# Patient Record
Sex: MALE | Race: WHITE | NOT HISPANIC OR LATINO | Employment: UNEMPLOYED | URBAN - NONMETROPOLITAN AREA
[De-identification: names, ages, dates, MRNs, and addresses within clinical notes are randomized per-mention and may not be internally consistent; named-entity substitution may affect disease eponyms.]

---

## 2019-02-20 ENCOUNTER — OFFICE VISIT (OUTPATIENT)
Dept: CARDIOLOGY | Facility: CLINIC | Age: 34
End: 2019-02-20

## 2019-02-20 VITALS
WEIGHT: 207 LBS | BODY MASS INDEX: 27.43 KG/M2 | HEART RATE: 57 BPM | DIASTOLIC BLOOD PRESSURE: 100 MMHG | SYSTOLIC BLOOD PRESSURE: 132 MMHG | HEIGHT: 73 IN

## 2019-02-20 DIAGNOSIS — R00.1 BRADYCARDIA: ICD-10-CM

## 2019-02-20 DIAGNOSIS — I10 ESSENTIAL HYPERTENSION: ICD-10-CM

## 2019-02-20 DIAGNOSIS — R42 DIZZINESS: ICD-10-CM

## 2019-02-20 DIAGNOSIS — R01.1 MURMUR, CARDIAC: ICD-10-CM

## 2019-02-20 DIAGNOSIS — R07.2 PRECORDIAL PAIN: Primary | ICD-10-CM

## 2019-02-20 DIAGNOSIS — R94.31 ABNORMAL EKG: ICD-10-CM

## 2019-02-20 DIAGNOSIS — F19.90 IV DRUG USER: ICD-10-CM

## 2019-02-20 DIAGNOSIS — R00.2 PALPITATIONS: ICD-10-CM

## 2019-02-20 PROCEDURE — 99204 OFFICE O/P NEW MOD 45 MIN: CPT | Performed by: INTERNAL MEDICINE

## 2019-02-20 PROCEDURE — 93000 ELECTROCARDIOGRAM COMPLETE: CPT | Performed by: INTERNAL MEDICINE

## 2019-02-20 RX ORDER — TIZANIDINE 4 MG/1
4 TABLET ORAL 4 TIMES DAILY
COMMUNITY

## 2019-02-20 RX ORDER — GABAPENTIN 400 MG/1
CAPSULE ORAL
COMMUNITY
End: 2021-06-23

## 2019-02-20 RX ORDER — RANITIDINE 300 MG/1
300 TABLET ORAL NIGHTLY
Qty: 30 TABLET | Refills: 8 | Status: SHIPPED | OUTPATIENT
Start: 2019-02-20 | End: 2021-06-23

## 2019-02-20 RX ORDER — AMLODIPINE BESYLATE 5 MG/1
5 TABLET ORAL DAILY
Qty: 30 TABLET | Refills: 11 | Status: SHIPPED | OUTPATIENT
Start: 2019-02-20 | End: 2021-06-23

## 2019-02-20 RX ORDER — BUPRENORPHINE HYDROCHLORIDE AND NALOXONE HYDROCHLORIDE DIHYDRATE 8; 2 MG/1; MG/1
2 TABLET SUBLINGUAL DAILY
COMMUNITY
End: 2021-06-23

## 2019-02-20 NOTE — PROGRESS NOTES
Chief Complaint   Patient presents with   • Establish Care     abnormal EKG, accidental finding, ordered prior to starting suboxone clinic   • Palpitations     randomly occurs, races, pounds, will last 10-15 minutes, feels fatigued afterwards.    • Chest Pain     mid chest, sharp pain in mid chest, just lasted a second or two   • Aspirin     does not take a daily aspirin   • Hypertension     has been on BP med before,  lisinopril 40mg wasn't controlling, PCP added HCTZ, dropped it to low, pt just stopped both        CARDIAC COMPLAINTS  chest pressure/discomfort, dyspnea, fatigue and palpitations      Subjective   Everett Fox is a 33 y.o. male came in today for his initial cardiac evaluation.  He has history of hypertension for which he used to be on lisinopril in the past.  He has history of drug abuse in the form of IV drug use.  He now wants to quit completely and he was seen at your office about starting Suboxone.  He at that time explained that his been having sharp chest pain in the mid part of the chest which lasts for few seconds and subsides spontaneously.  It occurs mostly in the evening when he sits down.  He also has been noticing palpitation in the form of heart skipping and racing which lasts for 10-15 minutes and subsides.  He does feel tired and fatigued after that.  He also has been noticing shortness of breath which occurs mostly on exertion.  He has some occasional dizziness but never had any syncopal episode.  He used to be a smoker but quit in 2008.  He does dips tobacco.  He does use multiple different drugs.  The last time he used cocaine was few years ago.  He did have some lab work done at your office which showed that he has both hepatitis B and hepatitis C antibodies.  His renal function and liver function were within normal limit his TSH was normal.  He also had an EKG done which was abnormal and hence he is referred here for further evaluation.    History reviewed. No pertinent  surgical history.    Current Outpatient Medications   Medication Sig Dispense Refill   • buprenorphine-naloxone (SUBOXONE) 8-2 MG per SL tablet Place 2 tablets under the tongue Daily.     • gabapentin (NEURONTIN) 400 MG capsule 4 tabs once a day     • tiZANidine (ZANAFLEX) 4 MG tablet 1 tab twice a day     • amLODIPine (NORVASC) 5 MG tablet Take 1 tablet by mouth Daily. 30 tablet 11   • raNITIdine (ZANTAC) 300 MG tablet Take 1 tablet by mouth Every Night. 30 tablet 8     No current facility-administered medications for this visit.            ALLERGIES:  Allegra-d [fexofenadine-pseudoephed er] and Hydroxyzine    Past Medical History:   Diagnosis Date   • Abnormal ECG    • History of cholecystectomy    • Hypertension    • Kidney stones        Social History     Tobacco Use   Smoking Status Former Smoker   • Years: 10.00   • Types: Cigarettes   • Last attempt to quit:    • Years since quittin.1   Smokeless Tobacco Current User   • Types: Snuff          Family History   Problem Relation Age of Onset   • Irregular heart beat Mother    • Heart disease Father         CABG at 50 years old   • Hypertension Father    • Hyperlipidemia Father    • Irregular heart beat Sister        Review of Systems   Constitution: Positive for weakness and malaise/fatigue. Negative for decreased appetite.   HENT: Negative for congestion and sore throat.    Eyes: Negative for blurred vision.   Cardiovascular: Positive for chest pain, dyspnea on exertion and palpitations.   Respiratory: Positive for shortness of breath. Negative for snoring.    Endocrine: Negative for cold intolerance and heat intolerance.   Hematologic/Lymphatic: Negative for adenopathy. Does not bruise/bleed easily.   Skin: Negative for itching, nail changes and skin cancer.   Musculoskeletal: Negative for arthritis and myalgias.   Gastrointestinal: Positive for dysphagia. Negative for abdominal pain and heartburn.   Genitourinary: Negative for bladder incontinence  "and frequency.   Neurological: Positive for dizziness. Negative for light-headedness, seizures and vertigo.   Psychiatric/Behavioral: Negative for altered mental status.   Allergic/Immunologic: Negative for environmental allergies and hives.       Diabetes- No  Thyroid- normal    Objective     /100 (BP Location: Right arm)   Pulse 57   Ht 185.4 cm (73\")   Wt 93.9 kg (207 lb)   BMI 27.31 kg/m²     Physical Exam   Constitutional: He is oriented to person, place, and time. He appears well-developed and well-nourished.   HENT:   Head: Normocephalic.   Nose: Nose normal.   Eyes: EOM are normal. Pupils are equal, round, and reactive to light.   Neck: Normal range of motion. Neck supple.   Cardiovascular: Regular rhythm, S1 normal and S2 normal. Bradycardia present.   Murmur heard.  Pulmonary/Chest: Effort normal and breath sounds normal.   Abdominal: Soft. Bowel sounds are normal.   Musculoskeletal: Normal range of motion. He exhibits no edema.   Neurological: He is alert and oriented to person, place, and time.   Skin: Skin is warm and dry.   Psychiatric: He has a normal mood and affect.         ECG 12 Lead  Date/Time: 2/20/2019 4:46 PM  Performed by: Jessica Lamar MD  Authorized by: Jessica Lamar MD   Comparison: compared with previous ECG from 1/14/2019  Comparison to previous ECG: Rate is slower  Rhythm: sinus bradycardia  Rate: bradycardic  QRS axis: normal  Other findings: early repolarization    Clinical impression: non-specific ECG              Assessment/Plan   Patient's Body mass index is 27.31 kg/m². BMI is above normal parameters. Recommendations include: educational material, exercise counseling and nutrition counseling.     Everett was seen today for establish care, palpitations, chest pain, aspirin and hypertension.    Diagnoses and all orders for this visit:    Precordial pain  -     raNITIdine (ZANTAC) 300 MG tablet; Take 1 tablet by mouth Every Night.  -     Stress Test With " Myocardial Perfusion One Day; Future  -     High Sensitivity CRP; Future  -     Lipid Panel; Future    Palpitations  -     Adult Transthoracic Echo Complete W/ Cont if Necessary Per Protocol; Future    Abnormal EKG  -     Lipid Panel; Future    Dizziness  -     Adult Transthoracic Echo Complete W/ Cont if Necessary Per Protocol; Future    IV drug user    Murmur, cardiac    Bradycardia    Essential hypertension  -     amLODIPine (NORVASC) 5 MG tablet; Take 1 tablet by mouth Daily.     At baseline, his hypertensive and slightly bradycardic.  His EKG showed sinus bradycardia with early repolarization versus nonspecific ST-T changes.  His clinical examination reveals a BMI of 27.  He does have bradycardia and short systolic murmur at the mitral area.  Had a long talk with him about his lab.  He is advised to see a hepatologist regarding that he also has been having symptoms of dysphagia.  Is advised to have an EGD done.  Regarding the abnormal EKG, he needs further workup including stress test to evaluate his functional status, chronotropic response and to rule out ischemia.  He also need an echocardiogram to rule out any vegetation as well as evaluate his LV function and the valvular structures.  At this time I started him on amlodipine 5 mg once a day.  Also advised him to have labs done to check his CRP level and lipids.  Regarding the chest pain, I also started him on Zantac 300 mg once a day.  Based on the results of the tests, and the response to the medication further recommendations will be made.  Regarding his BMI, I also talked to him about different diets and gave him papers on DASH diet.               Electronically signed by Jessica Lamar MD February 20, 2019 4:39 PM

## 2019-02-20 NOTE — PATIENT INSTRUCTIONS
"DASH Eating Plan  DASH stands for \"Dietary Approaches to Stop Hypertension.\" The DASH eating plan is a healthy eating plan that has been shown to reduce high blood pressure (hypertension). It may also reduce your risk for type 2 diabetes, heart disease, and stroke. The DASH eating plan may also help with weight loss.  What are tips for following this plan?  General guidelines  · Avoid eating more than 2,300 mg (milligrams) of salt (sodium) a day. If you have hypertension, you may need to reduce your sodium intake to 1,500 mg a day.  · Limit alcohol intake to no more than 1 drink a day for nonpregnant women and 2 drinks a day for men. One drink equals 12 oz of beer, 5 oz of wine, or 1½ oz of hard liquor.  · Work with your health care provider to maintain a healthy body weight or to lose weight. Ask what an ideal weight is for you.  · Get at least 30 minutes of exercise that causes your heart to beat faster (aerobic exercise) most days of the week. Activities may include walking, swimming, or biking.  · Work with your health care provider or diet and nutrition specialist (dietitian) to adjust your eating plan to your individual calorie needs.  Reading food labels  · Check food labels for the amount of sodium per serving. Choose foods with less than 5 percent of the Daily Value of sodium. Generally, foods with less than 300 mg of sodium per serving fit into this eating plan.  · To find whole grains, look for the word \"whole\" as the first word in the ingredient list.  Shopping  · Buy products labeled as \"low-sodium\" or \"no salt added.\"  · Buy fresh foods. Avoid canned foods and premade or frozen meals.  Cooking  · Avoid adding salt when cooking. Use salt-free seasonings or herbs instead of table salt or sea salt. Check with your health care provider or pharmacist before using salt substitutes.  · Do not amin foods. Cook foods using healthy methods such as baking, boiling, grilling, and broiling instead.  · Cook with " heart-healthy oils, such as olive, canola, soybean, or sunflower oil.  Meal planning    · Eat a balanced diet that includes:  ? 5 or more servings of fruits and vegetables each day. At each meal, try to fill half of your plate with fruits and vegetables.  ? Up to 6-8 servings of whole grains each day.  ? Less than 6 oz of lean meat, poultry, or fish each day. A 3-oz serving of meat is about the same size as a deck of cards. One egg equals 1 oz.  ? 2 servings of low-fat dairy each day.  ? A serving of nuts, seeds, or beans 5 times each week.  ? Heart-healthy fats. Healthy fats called Omega-3 fatty acids are found in foods such as flaxseeds and coldwater fish, like sardines, salmon, and mackerel.  · Limit how much you eat of the following:  ? Canned or prepackaged foods.  ? Food that is high in trans fat, such as fried foods.  ? Food that is high in saturated fat, such as fatty meat.  ? Sweets, desserts, sugary drinks, and other foods with added sugar.  ? Full-fat dairy products.  · Do not salt foods before eating.  · Try to eat at least 2 vegetarian meals each week.  · Eat more home-cooked food and less restaurant, buffet, and fast food.  · When eating at a restaurant, ask that your food be prepared with less salt or no salt, if possible.  What foods are recommended?  The items listed may not be a complete list. Talk with your dietitian about what dietary choices are best for you.  Grains  Whole-grain or whole-wheat bread. Whole-grain or whole-wheat pasta. Brown rice. Oatmeal. Quinoa. Bulgur. Whole-grain and low-sodium cereals. Nina bread. Low-fat, low-sodium crackers. Whole-wheat flour tortillas.  Vegetables  Fresh or frozen vegetables (raw, steamed, roasted, or grilled). Low-sodium or reduced-sodium tomato and vegetable juice. Low-sodium or reduced-sodium tomato sauce and tomato paste. Low-sodium or reduced-sodium canned vegetables.  Fruits  All fresh, dried, or frozen fruit. Canned fruit in natural juice (without  added sugar).  Meat and other protein foods  Skinless chicken or turkey. Ground chicken or turkey. Pork with fat trimmed off. Fish and seafood. Egg whites. Dried beans, peas, or lentils. Unsalted nuts, nut butters, and seeds. Unsalted canned beans. Lean cuts of beef with fat trimmed off. Low-sodium, lean deli meat.  Dairy  Low-fat (1%) or fat-free (skim) milk. Fat-free, low-fat, or reduced-fat cheeses. Nonfat, low-sodium ricotta or cottage cheese. Low-fat or nonfat yogurt. Low-fat, low-sodium cheese.  Fats and oils  Soft margarine without trans fats. Vegetable oil. Low-fat, reduced-fat, or light mayonnaise and salad dressings (reduced-sodium). Canola, safflower, olive, soybean, and sunflower oils. Avocado.  Seasoning and other foods  Herbs. Spices. Seasoning mixes without salt. Unsalted popcorn and pretzels. Fat-free sweets.  What foods are not recommended?  The items listed may not be a complete list. Talk with your dietitian about what dietary choices are best for you.  Grains  Baked goods made with fat, such as croissants, muffins, or some breads. Dry pasta or rice meal packs.  Vegetables  Creamed or fried vegetables. Vegetables in a cheese sauce. Regular canned vegetables (not low-sodium or reduced-sodium). Regular canned tomato sauce and paste (not low-sodium or reduced-sodium). Regular tomato and vegetable juice (not low-sodium or reduced-sodium). Pickles. Olives.  Fruits  Canned fruit in a light or heavy syrup. Fried fruit. Fruit in cream or butter sauce.  Meat and other protein foods  Fatty cuts of meat. Ribs. Fried meat. Chowdhury. Sausage. Bologna and other processed lunch meats. Salami. Fatback. Hotdogs. Bratwurst. Salted nuts and seeds. Canned beans with added salt. Canned or smoked fish. Whole eggs or egg yolks. Chicken or turkey with skin.  Dairy  Whole or 2% milk, cream, and half-and-half. Whole or full-fat cream cheese. Whole-fat or sweetened yogurt. Full-fat cheese. Nondairy creamers. Whipped toppings.  Processed cheese and cheese spreads.  Fats and oils  Butter. Stick margarine. Lard. Shortening. Ghee. Chowdhury fat. Tropical oils, such as coconut, palm kernel, or palm oil.  Seasoning and other foods  Salted popcorn and pretzels. Onion salt, garlic salt, seasoned salt, table salt, and sea salt. Worcestershire sauce. Tartar sauce. Barbecue sauce. Teriyaki sauce. Soy sauce, including reduced-sodium. Steak sauce. Canned and packaged gravies. Fish sauce. Oyster sauce. Cocktail sauce. Horseradish that you find on the shelf. Ketchup. Mustard. Meat flavorings and tenderizers. Bouillon cubes. Hot sauce and Tabasco sauce. Premade or packaged marinades. Premade or packaged taco seasonings. Relishes. Regular salad dressings.  Where to find more information:  · National [employer], [employer] [place]: www.nhlbi.nih.gov  · American Heart Association: www.heart.org  Summary  · The DASH eating plan is a healthy eating plan that has been shown to reduce high blood pressure (hypertension). It may also reduce your risk for type 2 diabetes, heart disease, and stroke.  · With the DASH eating plan, you should limit salt (sodium) intake to 2,300 mg a day. If you have hypertension, you may need to reduce your sodium intake to 1,500 mg a day.  · When on the DASH eating plan, aim to eat more fresh fruits and vegetables, whole grains, lean proteins, low-fat dairy, and heart-healthy fats.  · Work with your health care provider or diet and nutrition specialist (dietitian) to adjust your eating plan to your individual calorie needs.  This information is not intended to replace advice given to you by your health care provider. Make sure you discuss any questions you have with your health care provider.  Document Released: 12/06/2012 Document Revised: 12/11/2017 Document Reviewed: 12/11/2017  [employer] Patient Education © 2018 [employer]

## 2019-03-04 ENCOUNTER — HOSPITAL ENCOUNTER (OUTPATIENT)
Dept: CARDIOLOGY | Facility: HOSPITAL | Age: 34
Discharge: HOME | End: 2019-03-04

## 2019-03-04 ENCOUNTER — LAB (OUTPATIENT)
Dept: LAB | Facility: HOSPITAL | Age: 34
End: 2019-03-04

## 2019-03-04 DIAGNOSIS — R94.31 ABNORMAL EKG: ICD-10-CM

## 2019-03-04 DIAGNOSIS — R07.2 PRECORDIAL PAIN: ICD-10-CM

## 2019-03-04 DIAGNOSIS — R42 DIZZINESS: ICD-10-CM

## 2019-03-04 DIAGNOSIS — R00.2 PALPITATIONS: ICD-10-CM

## 2019-03-04 LAB
BH CV ECHO MEAS - ACS: 1.9 CM
BH CV ECHO MEAS - AO MEAN PG: 5.5 MMHG
BH CV ECHO MEAS - AO ROOT AREA (BSA CORRECTED): 1.3
BH CV ECHO MEAS - AO ROOT AREA: 6.4 CM^2
BH CV ECHO MEAS - AO ROOT DIAM: 2.9 CM
BH CV ECHO MEAS - AO V2 MEAN: 109.2 CM/SEC
BH CV ECHO MEAS - AO V2 VTI: 36.1 CM
BH CV ECHO MEAS - BSA(HAYCOCK): 2.2 M^2
BH CV ECHO MEAS - BSA: 2.2 M^2
BH CV ECHO MEAS - BZI_BMI: 27.3 KILOGRAMS/M^2
BH CV ECHO MEAS - BZI_METRIC_HEIGHT: 185.4 CM
BH CV ECHO MEAS - BZI_METRIC_WEIGHT: 93.9 KG
BH CV ECHO MEAS - EDV(CUBED): 79.8 ML
BH CV ECHO MEAS - EDV(MOD-SP4): 93 ML
BH CV ECHO MEAS - EDV(TEICH): 83.3 ML
BH CV ECHO MEAS - EF(CUBED): 70.7 %
BH CV ECHO MEAS - EF(MOD-SP4): 69.9 %
BH CV ECHO MEAS - EF(TEICH): 62.7 %
BH CV ECHO MEAS - ESV(CUBED): 23.4 ML
BH CV ECHO MEAS - ESV(MOD-SP4): 28 ML
BH CV ECHO MEAS - ESV(TEICH): 31.1 ML
BH CV ECHO MEAS - FS: 33.6 %
BH CV ECHO MEAS - IVS/LVPW: 1
BH CV ECHO MEAS - IVSD: 1.3 CM
BH CV ECHO MEAS - LA DIMENSION: 4.1 CM
BH CV ECHO MEAS - LA/AO: 1.4
BH CV ECHO MEAS - LV DIASTOLIC VOL/BSA (35-75): 42.6 ML/M^2
BH CV ECHO MEAS - LV IVRT: 0.07 SEC
BH CV ECHO MEAS - LV MASS(C)D: 194.5 GRAMS
BH CV ECHO MEAS - LV MASS(C)DI: 89.1 GRAMS/M^2
BH CV ECHO MEAS - LV SYSTOLIC VOL/BSA (12-30): 12.8 ML/M^2
BH CV ECHO MEAS - LVIDD: 4.3 CM
BH CV ECHO MEAS - LVIDS: 2.9 CM
BH CV ECHO MEAS - LVLD AP4: 7.4 CM
BH CV ECHO MEAS - LVLS AP4: 5.6 CM
BH CV ECHO MEAS - LVOT AREA (M): 3.8 CM^2
BH CV ECHO MEAS - LVOT AREA: 3.8 CM^2
BH CV ECHO MEAS - LVOT DIAM: 2.2 CM
BH CV ECHO MEAS - LVPWD: 1.2 CM
BH CV ECHO MEAS - MV A MAX VEL: 71.1 CM/SEC
BH CV ECHO MEAS - MV DEC SLOPE: 373.8 CM/SEC^2
BH CV ECHO MEAS - MV E MAX VEL: 107.1 CM/SEC
BH CV ECHO MEAS - MV E/A: 1.5
BH CV ECHO MEAS - RAP SYSTOLE: 10 MMHG
BH CV ECHO MEAS - RVDD: 3.4 CM
BH CV ECHO MEAS - RVSP: 29.8 MMHG
BH CV ECHO MEAS - SI(AO): 106.1 ML/M^2
BH CV ECHO MEAS - SI(CUBED): 25.8 ML/M^2
BH CV ECHO MEAS - SI(MOD-SP4): 29.8 ML/M^2
BH CV ECHO MEAS - SI(TEICH): 23.9 ML/M^2
BH CV ECHO MEAS - SV(AO): 231.6 ML
BH CV ECHO MEAS - SV(CUBED): 56.4 ML
BH CV ECHO MEAS - SV(MOD-SP4): 65 ML
BH CV ECHO MEAS - SV(TEICH): 52.2 ML
BH CV ECHO MEAS - TR MAX VEL: 222.6 CM/SEC
BH CV STRESS RECOVERY BP: NORMAL MMHG
BH CV STRESS RECOVERY HR: 101 BPM
LV EF NUC BP: 67 %
MAXIMAL PREDICTED HEART RATE: 187 BPM
MAXIMAL PREDICTED HEART RATE: 187 BPM
PERCENT MAX PREDICTED HR: 88.24 %
STRESS BASELINE BP: NORMAL MMHG
STRESS BASELINE HR: 64 BPM
STRESS PERCENT HR: 104 %
STRESS POST ESTIMATED WORKLOAD: 10.1 METS
STRESS POST EXERCISE DUR MIN: 8 MIN
STRESS POST EXERCISE DUR SEC: 40 SEC
STRESS POST PEAK BP: NORMAL MMHG
STRESS POST PEAK HR: 165 BPM
STRESS TARGET HR: 159 BPM
STRESS TARGET HR: 159 BPM

## 2019-03-04 PROCEDURE — 0 TECHNETIUM SESTAMIBI: Performed by: INTERNAL MEDICINE

## 2019-03-04 PROCEDURE — 36415 COLL VENOUS BLD VENIPUNCTURE: CPT

## 2019-03-04 PROCEDURE — 93018 CV STRESS TEST I&R ONLY: CPT | Performed by: INTERNAL MEDICINE

## 2019-03-04 PROCEDURE — A9500 TC99M SESTAMIBI: HCPCS | Performed by: INTERNAL MEDICINE

## 2019-03-04 PROCEDURE — 78452 HT MUSCLE IMAGE SPECT MULT: CPT

## 2019-03-04 PROCEDURE — 80061 LIPID PANEL: CPT | Performed by: INTERNAL MEDICINE

## 2019-03-04 PROCEDURE — 93306 TTE W/DOPPLER COMPLETE: CPT | Performed by: INTERNAL MEDICINE

## 2019-03-04 PROCEDURE — 93017 CV STRESS TEST TRACING ONLY: CPT

## 2019-03-04 PROCEDURE — 86141 C-REACTIVE PROTEIN HS: CPT | Performed by: INTERNAL MEDICINE

## 2019-03-04 PROCEDURE — 93306 TTE W/DOPPLER COMPLETE: CPT

## 2019-03-04 PROCEDURE — 78452 HT MUSCLE IMAGE SPECT MULT: CPT | Performed by: INTERNAL MEDICINE

## 2019-03-04 RX ADMIN — TECHNETIUM TC 99M SESTAMIBI 1 DOSE: 1 INJECTION INTRAVENOUS at 11:28

## 2019-03-04 RX ADMIN — TECHNETIUM TC 99M SESTAMIBI 1 DOSE: 1 INJECTION INTRAVENOUS at 11:27

## 2019-03-05 LAB
CHOLEST SERPL-MCNC: 113 MG/DL (ref 0–200)
HDLC SERPL-MCNC: 34 MG/DL (ref 60–100)
LDLC SERPL CALC-MCNC: 25 MG/DL (ref 0–100)
LDLC/HDLC SERPL: 0.74 {RATIO}
TRIGL SERPL-MCNC: 270 MG/DL (ref 0–150)
VLDLC SERPL-MCNC: 54 MG/DL

## 2019-03-06 LAB — CRP SERPL HS-MCNC: 0.76 MG/L (ref 0–3)

## 2021-06-23 ENCOUNTER — OFFICE VISIT (OUTPATIENT)
Dept: CARDIOLOGY | Facility: CLINIC | Age: 36
End: 2021-06-23

## 2021-06-23 VITALS
SYSTOLIC BLOOD PRESSURE: 110 MMHG | BODY MASS INDEX: 31.6 KG/M2 | HEIGHT: 74 IN | WEIGHT: 246.2 LBS | HEART RATE: 64 BPM | DIASTOLIC BLOOD PRESSURE: 68 MMHG

## 2021-06-23 DIAGNOSIS — R55 SYNCOPE AND COLLAPSE: ICD-10-CM

## 2021-06-23 DIAGNOSIS — R42 DIZZINESS: ICD-10-CM

## 2021-06-23 DIAGNOSIS — R53.1 WEAKNESS: ICD-10-CM

## 2021-06-23 DIAGNOSIS — R00.2 PALPITATIONS: Primary | ICD-10-CM

## 2021-06-23 DIAGNOSIS — R73.9 HYPERGLYCEMIA: ICD-10-CM

## 2021-06-23 DIAGNOSIS — R60.0 BILATERAL LEG EDEMA: ICD-10-CM

## 2021-06-23 DIAGNOSIS — R06.02 SHORTNESS OF BREATH: ICD-10-CM

## 2021-06-23 DIAGNOSIS — E78.5 HYPERLIPIDEMIA LDL GOAL <100: ICD-10-CM

## 2021-06-23 PROCEDURE — 99214 OFFICE O/P EST MOD 30 MIN: CPT | Performed by: NURSE PRACTITIONER

## 2021-06-23 RX ORDER — LISINOPRIL 5 MG/1
5 TABLET ORAL DAILY
COMMUNITY
End: 2022-02-08

## 2021-06-23 RX ORDER — ARIPIPRAZOLE 10 MG/1
10 TABLET ORAL DAILY
COMMUNITY
End: 2022-02-08

## 2021-06-23 RX ORDER — HYDROXYZINE 50 MG/1
50 TABLET, FILM COATED ORAL 2 TIMES DAILY
COMMUNITY

## 2021-06-23 RX ORDER — TRAZODONE HYDROCHLORIDE 100 MG/1
100 TABLET ORAL NIGHTLY
COMMUNITY

## 2021-06-23 RX ORDER — PRAZOSIN HYDROCHLORIDE 2 MG/1
2 CAPSULE ORAL NIGHTLY
COMMUNITY

## 2021-06-23 RX ORDER — SERTRALINE HYDROCHLORIDE 100 MG/1
100 TABLET, FILM COATED ORAL 2 TIMES DAILY
COMMUNITY

## 2021-06-23 NOTE — PROGRESS NOTES
"Chief Complaint   Patient presents with   • Follow-up     Cardiac management   • Lab     Last labs a month ago per PCP.   • Fatigue     Feeling tired, no energy for the last 1-2 months. PCP obtained echo, see chart.   • Edema     Having swelling in feet and legs, PCP treated to cellulitis, also had US, US in chart.   • Shortness of Breath     Having with minimal exertion, states \"at night when I walk to bathroom I can't get my breath, will also have pain in mid chest that radiates to left and right chest\".   • Chest Pain     Will have occasional jerking sharp pain in mid chest, short in duration. Has occasional nausea associated with pain.   • Dizziness     Started 1-2 months ago, states \"I have blacked out and fell to floor a few times\".    • Blood pressure     Has been fluctuating.   • Medications     Has questions concerning use of Sofosbuvir-Velpatasvir?     Subjective       Everett Fox is a 35 y.o. male with hypertension and history of IV drug use on Suboxone, HCV, HBV who was referred in 2019 for evaluation of abnormal EKG, chest pain, palpitations, weakness and fatigue.  At consult, he was hypertensive, amlodipine 5 mg added.  EKG showed nonspecific ST changes, therefore stress test recommended.  Echocardiogram to rule out vegetation and evaluate LV function.  He walked almost 9 minutes on the treadmill, normal blood pressure and chronotropic response.  No ischemia seen on nuclear images.  Echocardiogram showed EF 65%, LA mildly dilated, mild MR, RVSP 30 mmHg, no vegetation.  No aortic valve pathology seen.  Cholesterol was 113, , HDL 34, LDL 25.  CRP was normal.  He was advised to follow strict low carbohydrate diet.  We did not see him after this.    He returns today for follow-up at the request of his primary care for evaluation of weakness, fatigue, dizziness, syncope, chest pain and lower extremity edema.  Since he was last seen, he has gained almost 40 pounds.  He is now following with the " methadone clinic as well as psychiatrist.  Amlodipine has been changed to lisinopril.  Blood pressure fluctuates from low to high.  He had an episode of syncope about a month ago which occurred after standing up and walking toward the door.  The next thing he knew he was on the floor.  His main concern is lower extremity edema, L>R.  PCP ruled out DVT.  Treated for LE cellulitis.  Echocardiogram on 6/4/2021 in [place] showed EF 65%, no diastolic dysfunction, mild to moderate MR, LA 4.2 cm, moderate AR, no AS.  He was given Lasix 20 mg but did not help much.  Apparently labs were done 1 to 2 months ago but copy not available.  His mother is concerned about left facial drooping and left-sided weakness.  No recent drug use.         Cardiac History:    Past Surgical History:   Procedure Laterality Date   • CARDIOVASCULAR STRESS TEST  03/04/2019    8 Min. 40 Secs.10.1 METS.  88% THR. BP- 169/56.EF 67%. Negative.   • ECHO - CONVERTED  03/04/2019    EF 65%. LA- 4.1 Cm. Mild MR. RVSP- 30 mmHg.     Current Outpatient Medications   Medication Sig Dispense Refill   • ARIPiprazole (ABILIFY) 10 MG tablet Take 10 mg by mouth Daily.     • hydrOXYzine (ATARAX) 50 MG tablet Take 50 mg by mouth 2 (two) times a day.     • lisinopril (PRINIVIL,ZESTRIL) 5 MG tablet Take 5 mg by mouth Daily.     • METHADONE HCL PO Take 85 mg by mouth Daily.     • prazosin (MINIPRESS) 2 MG capsule Take 2 mg by mouth Every Night.     • sertraline (ZOLOFT) 100 MG tablet Take 100 mg by mouth 2 (two) times a day.     • tiZANidine (ZANAFLEX) 4 MG tablet Take 4 mg by mouth 3 (Three) Times a Day.     • traZODone (DESYREL) 100 MG tablet Take 100 mg by mouth Every Night.       No current facility-administered medications for this visit.     Allegra-d [fexofenadine-pseudoephed er]    Past Medical History:   Diagnosis Date   • Abnormal ECG    • Hepatitis C    • History of cholecystectomy    • Hypertension    • Kidney stones      Social History     Socioeconomic  "History   • Marital status: Single     Spouse name: Not on file   • Number of children: Not on file   • Years of education: Not on file   • Highest education level: Not on file   Tobacco Use   • Smoking status: Former Smoker     Years: 10.00     Types: Cigarettes     Quit date:      Years since quittin.4   • Smokeless tobacco: Current User     Types: Snuff   Vaping Use   • Vaping Use: Never used   Substance and Sexual Activity   • Alcohol use: No   • Drug use: Not Currently     Types: Marijuana, Morphine     Comment: history of IV drug abuse, last used last week     Family History   Problem Relation Age of Onset   • Irregular heart beat Mother    • Stroke Mother    • Heart disease Father         CABG at 50 years old   • Hypertension Father    • Hyperlipidemia Father    • Irregular heart beat Sister      Review of Systems   Constitutional: Positive for malaise/fatigue and weight gain (Up 40 pounds in 2 years).   HENT: Negative.    Eyes: Negative.    Cardiovascular: Positive for chest pain, dyspnea on exertion, leg swelling, near-syncope, palpitations and syncope.   Respiratory: Positive for shortness of breath.    Endocrine: Positive for cold intolerance.   Hematologic/Lymphatic: Negative.    Skin: Positive for dry skin.   Gastrointestinal: Positive for nausea.   Genitourinary: Positive for dysuria, hesitancy and incomplete emptying.   Neurological: Positive for difficulty with concentration, dizziness, light-headedness, sensory change, tremors and weakness.   Psychiatric/Behavioral: Positive for altered mental status (Appears medicated), depression and substance abuse.      Objective     /68 (BP Location: Right arm)   Pulse 64   Ht 186.7 cm (73.5\")   Wt 112 kg (246 lb 3.2 oz)   BMI 32.04 kg/m²     Vitals and nursing note reviewed.   Constitutional:       General: Not in acute distress.     Appearance: Well-developed. Not diaphoretic.   Eyes:      Pupils: Pupils are equal, round, and reactive to " light.   HENT:      Head: Normocephalic.   Pulmonary:      Effort: Pulmonary effort is normal. No respiratory distress.      Breath sounds: Normal breath sounds.   Cardiovascular:      Normal rate. Regular rhythm.      Murmurs: There is a grade 2/6 holosystolic murmur at the apex. There is a grade 1/4 low frequency middiastolic murmur at the URSB.   Pulses:     Intact distal pulses.   Edema:     Peripheral edema present.     Pretibial: bilateral trace edema of the pretibial area.     Ankle: bilateral 1+ edema of the ankle.  Abdominal:      General: Bowel sounds are normal.      Palpations: Abdomen is soft.   Musculoskeletal: Normal range of motion.      Cervical back: Normal range of motion. Skin:     General: Skin is warm and dry.      Coloration: Skin is pale.   Neurological:      Mental Status: Oriented to person, place, and time. Lethargic.      Comments: Answers questions appropriately but delayed responses        Procedures          Problem List Items Addressed This Visit        Cardiac and Vasculature    Palpitations - Primary    Relevant Orders    Holter Monitor - 72 Hour Up To 15 Days    Comprehensive Metabolic Panel    CBC & Differential    TSH    Magnesium       Symptoms and Signs    Dizziness    Relevant Orders    US Carotid Bilateral    Holter Monitor - 72 Hour Up To 15 Days    Comprehensive Metabolic Panel    CBC & Differential      Other Visit Diagnoses     Syncope and collapse        Relevant Orders    US Carotid Bilateral    Holter Monitor - 72 Hour Up To 15 Days    Comprehensive Metabolic Panel    CBC & Differential    Shortness of breath        Relevant Orders    Comprehensive Metabolic Panel    CBC & Differential    proBNP    Weakness        Relevant Orders    US Carotid Bilateral    Comprehensive Metabolic Panel    CBC & Differential    Hyperlipidemia LDL goal <100        Relevant Orders    Lipid Panel    Bilateral leg edema        Relevant Orders    proBNP    Sedimentation Rate    Hyperglycemia         Relevant Orders    Hemoglobin A1c         1.  HTN-appears to be well controlled at present 110/68.  Continue lisinopril 5 mg.  If BP increases to greater than 140/90, can take extra dose of lisinopril as needed.  Limit sodium.  Weight loss.    2.  LE edema-likely multifactorial, could be related to valvular dysfunction.  Will check labs including BNP to rule out volume overload.  CMP to evaluate protein and albumin.  Lipids, A1C checked.  If albumin low, trigs remain elevated, need 24 hour urine to rule out nephrotic syndrome. Continue ACE.    3. Dizziness/syncope- carotid US to rule out stenosis. He may need CT head to rule out CVA. Advised to discuss with PCP. Holter monitor x 14 days to rule out arrhythmia causing the syncope. His mother is concerned he may have had a stroke. Will evaluate for PAF.     4. AR- appeared to be moderate by echo read by cardiologist in [place]. Will discuss with Dr. Lamar. Recommend repeating echo in 6 months to reassess. Will order bubble study at that time to rule out PFO.     No changes made to his medications. Advised to keep bp log x two weeks and report. Further recommendations to follow labs, holter, carotid US. FU in 6 months or sooner as needed. Advised to keep fu.     Patient's Body mass index is 32.04 kg/m². indicating that he is obese (BMI >30). Obesity-related health conditions include the following: obstructive sleep apnea, hypertension, coronary heart disease, diabetes mellitus and dyslipidemias. Obesity is worsening. BMI is is above average; BMI management plan is completed. We discussed portion control and increasing exercise.     Everett Fox  reports that he quit smoking about 13 years ago. His smoking use included cigarettes. He quit after 10.00 years of use. His smokeless tobacco use includes snuff.. I have educated him on the risk of diseases from using tobacco products such as cancer, COPD and heart disease. I advised him to quit and he is not  willing to quit. I spent 3  minutes counseling the patient.            Electronically signed by JASEN Sanford,  June 23, 2021 10:50 EDT

## 2021-07-19 ENCOUNTER — APPOINTMENT (OUTPATIENT)
Dept: CARDIOLOGY | Facility: HOSPITAL | Age: 36
End: 2021-07-19

## 2021-08-10 ENCOUNTER — LAB (OUTPATIENT)
Dept: LAB | Facility: HOSPITAL | Age: 36
End: 2021-08-10

## 2021-08-10 ENCOUNTER — HOSPITAL ENCOUNTER (OUTPATIENT)
Dept: CARDIOLOGY | Facility: HOSPITAL | Age: 36
Discharge: HOME | End: 2021-08-10

## 2021-08-10 DIAGNOSIS — R53.1 WEAKNESS: ICD-10-CM

## 2021-08-10 DIAGNOSIS — R55 SYNCOPE AND COLLAPSE: ICD-10-CM

## 2021-08-10 DIAGNOSIS — E78.5 HYPERLIPIDEMIA LDL GOAL <100: ICD-10-CM

## 2021-08-10 DIAGNOSIS — R06.02 SHORTNESS OF BREATH: ICD-10-CM

## 2021-08-10 DIAGNOSIS — R42 DIZZINESS: ICD-10-CM

## 2021-08-10 DIAGNOSIS — R60.0 BILATERAL LEG EDEMA: ICD-10-CM

## 2021-08-10 DIAGNOSIS — R00.2 PALPITATIONS: ICD-10-CM

## 2021-08-10 DIAGNOSIS — R73.9 HYPERGLYCEMIA: ICD-10-CM

## 2021-08-10 LAB
ALBUMIN SERPL-MCNC: 4.38 G/DL (ref 3.5–5.2)
ALBUMIN/GLOB SERPL: 1.3 G/DL
ALP SERPL-CCNC: 180 U/L (ref 39–117)
ALT SERPL W P-5'-P-CCNC: 23 U/L (ref 1–41)
ANION GAP SERPL CALCULATED.3IONS-SCNC: 13.1 MMOL/L (ref 5–15)
AST SERPL-CCNC: 27 U/L (ref 1–40)
BASOPHILS # BLD AUTO: 0.1 10*3/MM3 (ref 0–0.2)
BASOPHILS NFR BLD AUTO: 1.2 % (ref 0–1.5)
BILIRUB SERPL-MCNC: 0.9 MG/DL (ref 0–1.2)
BUN SERPL-MCNC: 7 MG/DL (ref 6–20)
BUN/CREAT SERPL: 6.4 (ref 7–25)
CALCIUM SPEC-SCNC: 9.6 MG/DL (ref 8.6–10.5)
CHLORIDE SERPL-SCNC: 98 MMOL/L (ref 98–107)
CHOLEST SERPL-MCNC: 174 MG/DL (ref 0–200)
CO2 SERPL-SCNC: 22.9 MMOL/L (ref 22–29)
CREAT SERPL-MCNC: 1.1 MG/DL (ref 0.76–1.27)
DEPRECATED RDW RBC AUTO: 40.5 FL (ref 37–54)
EOSINOPHIL # BLD AUTO: 0.51 10*3/MM3 (ref 0–0.4)
EOSINOPHIL NFR BLD AUTO: 6 % (ref 0.3–6.2)
ERYTHROCYTE [DISTWIDTH] IN BLOOD BY AUTOMATED COUNT: 15 % (ref 12.3–15.4)
ERYTHROCYTE [SEDIMENTATION RATE] IN BLOOD: 10 MM/HR (ref 0–15)
GFR SERPL CREATININE-BSD FRML MDRD: 76 ML/MIN/1.73
GLOBULIN UR ELPH-MCNC: 3.3 GM/DL
GLUCOSE SERPL-MCNC: 125 MG/DL (ref 65–99)
HBA1C MFR BLD: 6.7 % (ref 4.8–5.6)
HCT VFR BLD AUTO: 44.5 % (ref 37.5–51)
HDLC SERPL-MCNC: 31 MG/DL (ref 40–60)
HGB BLD-MCNC: 13.8 G/DL (ref 13–17.7)
IMM GRANULOCYTES # BLD AUTO: 0.04 10*3/MM3 (ref 0–0.05)
IMM GRANULOCYTES NFR BLD AUTO: 0.5 % (ref 0–0.5)
LDLC SERPL CALC-MCNC: 106 MG/DL (ref 0–100)
LDLC/HDLC SERPL: 3.23 {RATIO}
LYMPHOCYTES # BLD AUTO: 2.35 10*3/MM3 (ref 0.7–3.1)
LYMPHOCYTES NFR BLD AUTO: 27.5 % (ref 19.6–45.3)
MAGNESIUM SERPL-MCNC: 1.9 MG/DL (ref 1.6–2.6)
MCH RBC QN AUTO: 23.3 PG (ref 26.6–33)
MCHC RBC AUTO-ENTMCNC: 31 G/DL (ref 31.5–35.7)
MCV RBC AUTO: 75.2 FL (ref 79–97)
MONOCYTES # BLD AUTO: 0.42 10*3/MM3 (ref 0.1–0.9)
MONOCYTES NFR BLD AUTO: 4.9 % (ref 5–12)
NEUTROPHILS NFR BLD AUTO: 5.14 10*3/MM3 (ref 1.7–7)
NEUTROPHILS NFR BLD AUTO: 59.9 % (ref 42.7–76)
NRBC BLD AUTO-RTO: 0 /100 WBC (ref 0–0.2)
NT-PROBNP SERPL-MCNC: 1020 PG/ML (ref 0–450)
PLATELET # BLD AUTO: 283 10*3/MM3 (ref 140–450)
PMV BLD AUTO: 10.6 FL (ref 6–12)
POTASSIUM SERPL-SCNC: 4.5 MMOL/L (ref 3.5–5.2)
PROT SERPL-MCNC: 7.7 G/DL (ref 6–8.5)
RBC # BLD AUTO: 5.92 10*6/MM3 (ref 4.14–5.8)
SODIUM SERPL-SCNC: 134 MMOL/L (ref 136–145)
TRIGL SERPL-MCNC: 214 MG/DL (ref 0–150)
TSH SERPL DL<=0.05 MIU/L-ACNC: 3.22 UIU/ML (ref 0.27–4.2)
VLDLC SERPL-MCNC: 37 MG/DL (ref 5–40)
WBC # BLD AUTO: 8.56 10*3/MM3 (ref 3.4–10.8)

## 2021-08-10 PROCEDURE — 85652 RBC SED RATE AUTOMATED: CPT

## 2021-08-10 PROCEDURE — 36415 COLL VENOUS BLD VENIPUNCTURE: CPT

## 2021-08-10 PROCEDURE — 93880 EXTRACRANIAL BILAT STUDY: CPT | Performed by: RADIOLOGY

## 2021-08-10 PROCEDURE — 80061 LIPID PANEL: CPT

## 2021-08-10 PROCEDURE — 83880 ASSAY OF NATRIURETIC PEPTIDE: CPT

## 2021-08-10 PROCEDURE — 83735 ASSAY OF MAGNESIUM: CPT

## 2021-08-10 PROCEDURE — 83036 HEMOGLOBIN GLYCOSYLATED A1C: CPT

## 2021-08-10 PROCEDURE — 80050 GENERAL HEALTH PANEL: CPT

## 2021-08-10 PROCEDURE — 93880 EXTRACRANIAL BILAT STUDY: CPT

## 2021-08-11 RX ORDER — FUROSEMIDE 40 MG/1
TABLET ORAL
Qty: 30 TABLET | Refills: 2 | Status: SHIPPED | OUTPATIENT
Start: 2021-08-11 | End: 2021-08-13 | Stop reason: ALTCHOICE

## 2021-08-11 RX ORDER — POTASSIUM CHLORIDE 750 MG/1
TABLET, FILM COATED, EXTENDED RELEASE ORAL
Qty: 30 TABLET | Refills: 2 | Status: SHIPPED | OUTPATIENT
Start: 2021-08-11

## 2021-08-13 RX ORDER — BUMETANIDE 2 MG/1
TABLET ORAL
Qty: 30 TABLET | Refills: 1 | Status: SHIPPED | OUTPATIENT
Start: 2021-08-13

## 2021-11-01 ENCOUNTER — TELEPHONE (OUTPATIENT)
Dept: CARDIOLOGY | Facility: CLINIC | Age: 36
End: 2021-11-01

## 2021-11-01 RX ORDER — PROPRANOLOL HYDROCHLORIDE 10 MG/1
TABLET ORAL
Qty: 30 TABLET | Refills: 1 | Status: SHIPPED | OUTPATIENT
Start: 2021-11-01 | End: 2022-02-08 | Stop reason: SDUPTHER

## 2021-11-01 NOTE — TELEPHONE ENCOUNTER
He may be feeling PAC and PVC.  Holter worn previously showed average heart rate 64 with rare PAC and PVC.  No other arrhythmia.  He had shortness of breath and dizziness with no correlated EKG changes.    We could try low-dose Inderal 10 mg as needed for palpitations.  Not to exceed 2 tablets/day.  The fatigue and lethargy/sedation may be related to his pain meds and psych meds which have these side effects.    I am surprised he is as alert as he is with trazodone, Zanaflex, sertraline, methadone, hydroxyzine, Abilify.    If we slow his heart rate down too much, will only compound the problem.  Increase fluid intake.

## 2021-11-01 NOTE — TELEPHONE ENCOUNTER
"Patient called reporting having more palpations and extreme fatigue. He has been recently dx with diabetes and reports blood sugar . He states \"my heart will flutter and then I just feel like I am going out, my mom has checked my sugar and it is fine but she has problems getting me awake\".     He has been taking klonopin for the last 2 months and started sofosbuvir-velpatasvir a month ago. He is unable to recall last B/P readings yet states \"family doctor said it was ok\".  "

## 2021-11-02 NOTE — TELEPHONE ENCOUNTER
Spoke at length with mother Ada concerning medications and side effects. Patient can try low dose Inderal 10mg as needed for palpitation, not to exceed 2 tablets a day and increase fluid intake. Ada verbalized understanding and to inform patient.

## 2022-02-08 ENCOUNTER — OFFICE VISIT (OUTPATIENT)
Dept: CARDIOLOGY | Facility: CLINIC | Age: 37
End: 2022-02-08

## 2022-02-08 VITALS
WEIGHT: 242.8 LBS | BODY MASS INDEX: 32.18 KG/M2 | HEART RATE: 62 BPM | SYSTOLIC BLOOD PRESSURE: 120 MMHG | TEMPERATURE: 97.8 F | HEIGHT: 73 IN | DIASTOLIC BLOOD PRESSURE: 78 MMHG

## 2022-02-08 DIAGNOSIS — E66.9 OBESITY (BMI 30.0-34.9): ICD-10-CM

## 2022-02-08 DIAGNOSIS — R53.83 OTHER FATIGUE: ICD-10-CM

## 2022-02-08 DIAGNOSIS — R07.9 CHEST PAIN DUE TO GERD: ICD-10-CM

## 2022-02-08 DIAGNOSIS — K21.9 CHEST PAIN DUE TO GERD: ICD-10-CM

## 2022-02-08 DIAGNOSIS — R00.2 PALPITATIONS: ICD-10-CM

## 2022-02-08 DIAGNOSIS — R01.1 HEART MURMUR: Primary | ICD-10-CM

## 2022-02-08 DIAGNOSIS — I10 ESSENTIAL HYPERTENSION: ICD-10-CM

## 2022-02-08 DIAGNOSIS — R06.02 SHORTNESS OF BREATH: ICD-10-CM

## 2022-02-08 PROCEDURE — 99214 OFFICE O/P EST MOD 30 MIN: CPT | Performed by: NURSE PRACTITIONER

## 2022-02-08 RX ORDER — LEVOTHYROXINE SODIUM 0.05 MG/1
50 TABLET ORAL DAILY
COMMUNITY
Start: 2022-01-27

## 2022-02-08 RX ORDER — FUROSEMIDE 40 MG/1
40 TABLET ORAL DAILY
COMMUNITY
Start: 2022-01-13

## 2022-02-08 RX ORDER — PROPRANOLOL HYDROCHLORIDE 10 MG/1
TABLET ORAL
Qty: 30 TABLET | Refills: 8 | Status: SHIPPED | OUTPATIENT
Start: 2022-02-08

## 2022-02-08 RX ORDER — GABAPENTIN 800 MG/1
800 TABLET ORAL 4 TIMES DAILY
COMMUNITY
Start: 2022-01-13

## 2022-02-08 RX ORDER — CLONAZEPAM 0.5 MG/1
0.5 TABLET ORAL 2 TIMES DAILY
COMMUNITY
Start: 2022-02-04

## 2022-02-08 RX ORDER — ATORVASTATIN CALCIUM 40 MG/1
40 TABLET, FILM COATED ORAL
COMMUNITY
Start: 2022-01-27

## 2022-02-08 RX ORDER — TAMSULOSIN HYDROCHLORIDE 0.4 MG/1
1 CAPSULE ORAL 2 TIMES DAILY
COMMUNITY
Start: 2022-01-27
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
## 2024-04-09 ENCOUNTER — TELEPHONE (OUTPATIENT)
Dept: CARDIOLOGY | Facility: CLINIC | Age: 39
End: 2024-04-09
Payer: COMMERCIAL

## 2024-04-09 NOTE — TELEPHONE ENCOUNTER
Received fax from Dr. Pavel Sullivan for cardiac clearance for patient to have an L5-S1 PLIF. According to our records, I do not see where patient is on any blood thinners or has had any stenting.          Fax [phone]

## 2024-04-09 NOTE — TELEPHONE ENCOUNTER
Letter sent stating:  We have not seen patient in our office in over 2 years, will need to get clearance from PCP.

## 2024-04-16 ENCOUNTER — OFFICE VISIT (OUTPATIENT)
Dept: CARDIOLOGY | Facility: CLINIC | Age: 39
End: 2024-04-16
Payer: COMMERCIAL

## 2024-04-16 VITALS
BODY MASS INDEX: 32.29 KG/M2 | HEIGHT: 73 IN | SYSTOLIC BLOOD PRESSURE: 110 MMHG | WEIGHT: 243.6 LBS | DIASTOLIC BLOOD PRESSURE: 70 MMHG | HEART RATE: 64 BPM

## 2024-04-16 DIAGNOSIS — R01.1 MURMUR, CARDIAC: Primary | ICD-10-CM

## 2024-04-16 DIAGNOSIS — I35.1 NONRHEUMATIC AORTIC VALVE INSUFFICIENCY: ICD-10-CM

## 2024-04-16 DIAGNOSIS — Z01.818 PREOPERATIVE CLEARANCE: ICD-10-CM

## 2024-04-16 RX ORDER — ALBUTEROL SULFATE 90 UG/1
2 AEROSOL, METERED RESPIRATORY (INHALATION) EVERY 4 HOURS PRN
COMMUNITY

## 2024-04-16 RX ORDER — OMEPRAZOLE 20 MG/1
20 CAPSULE, DELAYED RELEASE ORAL DAILY
COMMUNITY

## 2024-04-16 RX ORDER — BUPROPION HYDROCHLORIDE 300 MG/1
300 TABLET ORAL DAILY
COMMUNITY

## 2024-04-16 RX ORDER — LURASIDONE HYDROCHLORIDE 80 MG/1
80 TABLET, FILM COATED ORAL DAILY
COMMUNITY

## 2024-04-16 RX ORDER — CLONAZEPAM 1 MG/1
1 TABLET ORAL 2 TIMES DAILY PRN
COMMUNITY

## 2024-04-16 RX ORDER — MAGNESIUM OXIDE 400 MG/1
200 TABLET ORAL DAILY
COMMUNITY

## 2024-04-16 RX ORDER — PREGABALIN 300 MG/1
300 CAPSULE ORAL 2 TIMES DAILY
COMMUNITY

## 2024-04-16 RX ORDER — SEMAGLUTIDE 1.34 MG/ML
1 INJECTION, SOLUTION SUBCUTANEOUS WEEKLY
COMMUNITY

## 2024-04-16 NOTE — LETTER
April 18, 2024       No Recipients    Patient: Everett Fox   YOB: 1985   Date of Visit: 4/16/2024       Dear JASEN Salinas    Everett Fox was in my office today. Below is a copy of my note.    If you have questions, please do not hesitate to call me. I look forward to following Everett along with you.         Sincerely,        JASEN Sanford        CC:   No Recipients    Chief Complaint   Patient presents with   • Follow-up     Cardiac management   • Lab     Last labs a month ago per PCP. Has dx of Diabetes since last visit. He did not have echo 2/2022   • Cardiac clearance     Will need clearance for L5-S1 PLIF with Dr Pavel Sullivan.   • Med Refill     PCP writes refills on medications.     Subjective      Everett Fox is a 38 y.o. male with HTN and history of IV drug use on Suboxone, HCV, HBV referred in 2019 for evaluation of abnormal EKG, chest pain, palpitations, weakness and fatigue. At consult, he was hypertensive, amlodipine 5 mg added.  EKG showed nonspecific ST changes. Stress test and echocardiogram showed normal blood pressure and chronotropic response.  No ischemia , normal EF, no vegetation, no valvular pathology. Holter from 7/26/2021 showed rare PAC/PVC.  Carotid US 8/10/2021 normal.     Echo 6/4/2021 in [place] reported moderate aortic regurgitation, mild to mod MR and dilated LA.     He presents today for follow up requesting cardiac clearance to undergo lumbar spine fusion per Dr. Pavel Sullivan. We have not seen him since 2022. He denies CP. Mild dyspnea on exertion, not any worse than before. He has maintained smoking cessation. Glucose running 100-180 most days. Takes Lasix PRN, usually 2 x week. He remains drug free for 3 years. Labs per PCP.        Cardiac History:    Past Surgical History:   Procedure Laterality Date   • CARDIOVASCULAR STRESS TEST  03/04/2019    8 Min. 40 Secs.10.1 METS.  88% THR. BP- 169/56.EF 67%. Negative.   • CONVERTED (HISTORICAL) HOLTER   07/26/2021    14 days. AVG 64. . Rare PAC & PVC   • ECHO - CONVERTED  03/04/2019    EF 65%. LA- 4.1 Cm. Mild MR. RVSP- 30 mmHg.   • ECHO - CONVERTED  06/04/2021    ([place]) EF 65%, mod AR, mild to mod MR, dilated LA     Current Outpatient Medications   Medication Sig Dispense Refill   • albuterol sulfate  (90 Base) MCG/ACT inhaler Inhale 2 puffs Every 4 (Four) Hours As Needed for Wheezing.     • buPROPion XL (Wellbutrin XL) 300 MG 24 hr tablet Take 1 tablet by mouth Daily.     • clonazePAM (KlonoPIN) 1 MG tablet Take 1 tablet by mouth 2 (Two) Times a Day As Needed.     • furosemide (LASIX) 40 MG tablet Take 1 tablet by mouth Daily As Needed.     • hydrOXYzine (ATARAX) 50 MG tablet Take 1 tablet by mouth 2 (two) times a day.     • levothyroxine (SYNTHROID, LEVOTHROID) 50 MCG tablet Take 1 tablet by mouth Daily.     • Lurasidone HCl (Latuda) 80 MG tablet tablet Take 1 tablet by mouth Daily.     • magnesium oxide (MAG-OX) 400 MG tablet Take 0.5 tablets by mouth Daily.     • metFORMIN (GLUCOPHAGE) 1000 MG tablet Take 1 tablet by mouth 2 (Two) Times a Day.     • METHADONE HCL PO Take 115 mg by mouth Daily.     • omeprazole (priLOSEC) 20 MG capsule Take 1 capsule by mouth Daily.     • potassium chloride 10 MEQ CR tablet Take 1 tablet with Lasix dose 30 tablet 2   • prazosin (MINIPRESS) 2 MG capsule Take 1 capsule by mouth Every Night.     • pregabalin (LYRICA) 300 MG capsule Take 1 capsule by mouth 2 (Two) Times a Day.     • propranolol (INDERAL) 10 MG tablet Take 1 tablet by mouth as needed for palpitations.  Not to exceed 2 tablets/day. 30 tablet 8   • Semaglutide, 1 MG/DOSE, (Ozempic, 1 MG/DOSE,) 4 MG/3ML solution pen-injector Inject 1 mg under the skin into the appropriate area as directed 1 (One) Time Per Week.     • sertraline (ZOLOFT) 100 MG tablet Take 1 tablet by mouth 2 (two) times a day.     • tiZANidine (ZANAFLEX) 4 MG tablet Take 1 tablet by mouth 3 times a day.     • traZODone (DESYREL) 100  MG tablet Take 1 tablet by mouth Every Night.       No current facility-administered medications for this visit.     Allegra-d [fexofenadine-pseudoephed er]    Past Medical History:   Diagnosis Date   • Abnormal ECG    • Hepatitis C    • History of cholecystectomy    • Hypertension    • Kidney stones      Social History     Socioeconomic History   • Marital status: Single   Tobacco Use   • Smoking status: Former     Current packs/day: 0.00     Types: Cigarettes     Start date:      Quit date:      Years since quittin.3     Passive exposure: Current   • Smokeless tobacco: Current     Types: Snuff   Vaping Use   • Vaping status: Never Used   Substance and Sexual Activity   • Alcohol use: No   • Drug use: Not Currently     Types: Marijuana, Morphine, Heroin     Comment: history of IV drug abuse, last used last week   • Sexual activity: Defer     Family History   Problem Relation Age of Onset   • Irregular heart beat Mother    • Stroke Mother    • Heart disease Father         CABG at 50 years old   • Hypertension Father    • Hyperlipidemia Father    • Irregular heart beat Sister      Review of Systems   Constitutional: Positive for malaise/fatigue. Negative for decreased appetite.   HENT: Negative.     Eyes:  Negative for blurred vision.   Cardiovascular:  Positive for dyspnea on exertion. Negative for chest pain, leg swelling, palpitations and syncope.   Respiratory:  Negative for shortness of breath and sleep disturbances due to breathing.    Endocrine: Negative.    Hematologic/Lymphatic: Negative for bleeding problem. Does not bruise/bleed easily.   Skin: Negative.    Musculoskeletal:  Positive for back pain. Negative for falls and myalgias.   Gastrointestinal:  Negative for abdominal pain, heartburn and melena.   Genitourinary:  Negative for hematuria.   Neurological:  Negative for dizziness and light-headedness.   Psychiatric/Behavioral:  Negative for altered mental status.    Allergic/Immunologic:  "Negative.       Objective    /70 (BP Location: Right arm)   Pulse 64   Ht 185.4 cm (73\")   Wt 110 kg (243 lb 9.6 oz)   BMI 32.14 kg/m²     Vitals and nursing note reviewed.   Constitutional:       General: Not in acute distress.     Appearance: Well-developed. Not diaphoretic.   Eyes:      Pupils: Pupils are equal, round, and reactive to light.   HENT:      Head: Normocephalic.   Pulmonary:      Effort: Pulmonary effort is normal. No respiratory distress.      Breath sounds: Normal breath sounds.   Cardiovascular:      Normal rate. Regular rhythm.   Pulses:     Intact distal pulses.   Edema:     Peripheral edema absent.   Abdominal:      General: Bowel sounds are normal.      Palpations: Abdomen is soft.   Musculoskeletal: Normal range of motion.      Cervical back: Normal range of motion. Skin:     General: Skin is warm and dry.   Neurological:      Mental Status: Alert and oriented to person, place, and time.        Procedures          Problem List Items Addressed This Visit          Cardiac and Vasculature    Murmur, cardiac - Primary    Relevant Orders    Adult Transthoracic Echo Complete W/ Cont if Necessary Per Protocol     Other Visit Diagnoses       Preoperative clearance        Relevant Orders    Adult Transthoracic Echo Complete W/ Cont if Necessary Per Protocol    Nonrheumatic aortic valve insufficiency        Relevant Orders    Adult Transthoracic Echo Complete W/ Cont if Necessary Per Protocol          Preop clearance/AI  -echo in 2021 read at [facility] reported moderate AI  -recommend repeat echocardiogram at [facility] to evaluate aortic valve prior to clearing for surgery    Diabetes  -pt reports well controlled with home glucose readings 100-180    Palpitations  -well controlled  -previous holter reviewed, no significant arrhythmia  -continue propranolol       BMI is >= 30 and <35. (Class 1 Obesity). The following options were offered after discussion;: nutrition " counseling/recommendations               Electronically signed by JASEN Sanford,  April 18, 2024 11:09 EDT

## 2024-04-16 NOTE — PROGRESS NOTES
Chief Complaint   Patient presents with    Follow-up     Cardiac management    Lab     Last labs a month ago per PCP. Has dx of Diabetes since last visit. He did not have echo 2/2022    Cardiac clearance     Will need clearance for L5-S1 PLIF with Dr Pavel Sullivan.    Med Refill     PCP writes refills on medications.     Subjective       Everett Fox is a 38 y.o. male with HTN and history of IV drug use on Suboxone, HCV, HBV referred in 2019 for evaluation of abnormal EKG, chest pain, palpitations, weakness and fatigue. At consult, he was hypertensive, amlodipine 5 mg added.  EKG showed nonspecific ST changes. Stress test and echocardiogram showed normal blood pressure and chronotropic response.  No ischemia , normal EF, no vegetation, no valvular pathology. Holter from 7/26/2021 showed rare PAC/PVC.  Carotid US 8/10/2021 normal.     Echo 6/4/2021 in [place] reported moderate aortic regurgitation, mild to mod MR and dilated LA.     He presents today for follow up requesting cardiac clearance to undergo lumbar spine fusion per Dr. Pavel Sullivan. We have not seen him since 2022. He denies CP. Mild dyspnea on exertion, not any worse than before. He has maintained smoking cessation. Glucose running 100-180 most days. Takes Lasix PRN, usually 2 x week. He remains drug free for 3 years. Labs per PCP.        Cardiac History:    Past Surgical History:   Procedure Laterality Date    CARDIOVASCULAR STRESS TEST  03/04/2019    8 Min. 40 Secs.10.1 METS.  88% THR. BP- 169/56.EF 67%. Negative.    CONVERTED (HISTORICAL) HOLTER  07/26/2021    14 days. AVG 64. . Rare PAC & PVC    ECHO - CONVERTED  03/04/2019    EF 65%. LA- 4.1 Cm. Mild MR. RVSP- 30 mmHg.    ECHO - CONVERTED  06/04/2021    ([place]) EF 65%, mod AR, mild to mod MR, dilated LA     Current Outpatient Medications   Medication Sig Dispense Refill    albuterol sulfate  (90 Base) MCG/ACT inhaler Inhale 2 puffs Every 4 (Four) Hours As Needed for Wheezing.       buPROPion XL (Wellbutrin XL) 300 MG 24 hr tablet Take 1 tablet by mouth Daily.      clonazePAM (KlonoPIN) 1 MG tablet Take 1 tablet by mouth 2 (Two) Times a Day As Needed.      furosemide (LASIX) 40 MG tablet Take 1 tablet by mouth Daily As Needed.      hydrOXYzine (ATARAX) 50 MG tablet Take 1 tablet by mouth 2 (two) times a day.      levothyroxine (SYNTHROID, LEVOTHROID) 50 MCG tablet Take 1 tablet by mouth Daily.      Lurasidone HCl (Latuda) 80 MG tablet tablet Take 1 tablet by mouth Daily.      magnesium oxide (MAG-OX) 400 MG tablet Take 0.5 tablets by mouth Daily.      metFORMIN (GLUCOPHAGE) 1000 MG tablet Take 1 tablet by mouth 2 (Two) Times a Day.      METHADONE HCL PO Take 115 mg by mouth Daily.      omeprazole (priLOSEC) 20 MG capsule Take 1 capsule by mouth Daily.      potassium chloride 10 MEQ CR tablet Take 1 tablet with Lasix dose 30 tablet 2    prazosin (MINIPRESS) 2 MG capsule Take 1 capsule by mouth Every Night.      pregabalin (LYRICA) 300 MG capsule Take 1 capsule by mouth 2 (Two) Times a Day.      propranolol (INDERAL) 10 MG tablet Take 1 tablet by mouth as needed for palpitations.  Not to exceed 2 tablets/day. 30 tablet 8    Semaglutide, 1 MG/DOSE, (Ozempic, 1 MG/DOSE,) 4 MG/3ML solution pen-injector Inject 1 mg under the skin into the appropriate area as directed 1 (One) Time Per Week.      sertraline (ZOLOFT) 100 MG tablet Take 1 tablet by mouth 2 (two) times a day.      tiZANidine (ZANAFLEX) 4 MG tablet Take 1 tablet by mouth 3 times a day.      traZODone (DESYREL) 100 MG tablet Take 1 tablet by mouth Every Night.       No current facility-administered medications for this visit.     Allegra-d [fexofenadine-pseudoephed er]    Past Medical History:   Diagnosis Date    Abnormal ECG     Hepatitis C     History of cholecystectomy     Hypertension     Kidney stones      Social History     Socioeconomic History    Marital status: Single   Tobacco Use    Smoking status: Former     Current packs/day:  "0.00     Types: Cigarettes     Start date:      Quit date:      Years since quittin.3     Passive exposure: Current    Smokeless tobacco: Current     Types: Snuff   Vaping Use    Vaping status: Never Used   Substance and Sexual Activity    Alcohol use: No    Drug use: Not Currently     Types: Marijuana, Morphine, Heroin     Comment: history of IV drug abuse, last used last week    Sexual activity: Defer     Family History   Problem Relation Age of Onset    Irregular heart beat Mother     Stroke Mother     Heart disease Father         CABG at 50 years old    Hypertension Father     Hyperlipidemia Father     Irregular heart beat Sister      Review of Systems   Constitutional: Positive for malaise/fatigue. Negative for decreased appetite.   HENT: Negative.     Eyes:  Negative for blurred vision.   Cardiovascular:  Positive for dyspnea on exertion. Negative for chest pain, leg swelling, palpitations and syncope.   Respiratory:  Negative for shortness of breath and sleep disturbances due to breathing.    Endocrine: Negative.    Hematologic/Lymphatic: Negative for bleeding problem. Does not bruise/bleed easily.   Skin: Negative.    Musculoskeletal:  Positive for back pain. Negative for falls and myalgias.   Gastrointestinal:  Negative for abdominal pain, heartburn and melena.   Genitourinary:  Negative for hematuria.   Neurological:  Negative for dizziness and light-headedness.   Psychiatric/Behavioral:  Negative for altered mental status.    Allergic/Immunologic: Negative.       Objective     /70 (BP Location: Right arm)   Pulse 64   Ht 185.4 cm (73\")   Wt 110 kg (243 lb 9.6 oz)   BMI 32.14 kg/m²     Vitals and nursing note reviewed.   Constitutional:       General: Not in acute distress.     Appearance: Well-developed. Not diaphoretic.   Eyes:      Pupils: Pupils are equal, round, and reactive to light.   HENT:      Head: Normocephalic.   Pulmonary:      Effort: Pulmonary effort is normal. No " respiratory distress.      Breath sounds: Normal breath sounds.   Cardiovascular:      Normal rate. Regular rhythm.   Pulses:     Intact distal pulses.   Edema:     Peripheral edema absent.   Abdominal:      General: Bowel sounds are normal.      Palpations: Abdomen is soft.   Musculoskeletal: Normal range of motion.      Cervical back: Normal range of motion. Skin:     General: Skin is warm and dry.   Neurological:      Mental Status: Alert and oriented to person, place, and time.        Procedures          Problem List Items Addressed This Visit          Cardiac and Vasculature    Murmur, cardiac - Primary    Relevant Orders    Adult Transthoracic Echo Complete W/ Cont if Necessary Per Protocol     Other Visit Diagnoses       Preoperative clearance        Relevant Orders    Adult Transthoracic Echo Complete W/ Cont if Necessary Per Protocol    Nonrheumatic aortic valve insufficiency        Relevant Orders    Adult Transthoracic Echo Complete W/ Cont if Necessary Per Protocol          Preop clearance/AI  -echo in 2021 read at [facility] reported moderate AI  -recommend repeat echocardiogram at [facility] to evaluate aortic valve prior to clearing for surgery    Diabetes  -pt reports well controlled with home glucose readings 100-180    Palpitations  -well controlled  -previous holter reviewed, no significant arrhythmia  -continue propranolol       BMI is >= 30 and <35. (Class 1 Obesity). The following options were offered after discussion;: nutrition counseling/recommendations               Electronically signed by JASEN Sanford,  April 18, 2024 11:09 EDT

## 2024-04-25 ENCOUNTER — HOSPITAL ENCOUNTER (OUTPATIENT)
Dept: CARDIOLOGY | Facility: HOSPITAL | Age: 39
Discharge: HOME | End: 2024-04-25
Admitting: NURSE PRACTITIONER
Payer: COMMERCIAL

## 2024-04-25 DIAGNOSIS — I35.1 NONRHEUMATIC AORTIC VALVE INSUFFICIENCY: ICD-10-CM

## 2024-04-25 DIAGNOSIS — Z01.818 PREOPERATIVE CLEARANCE: ICD-10-CM

## 2024-04-25 DIAGNOSIS — R01.1 MURMUR, CARDIAC: ICD-10-CM

## 2024-04-25 LAB
BH CV ECHO MEAS - ACS: 2.32 CM
BH CV ECHO MEAS - AI P1/2T: 635.4 MSEC
BH CV ECHO MEAS - AO MAX PG: 6.8 MMHG
BH CV ECHO MEAS - AO MEAN PG: 4 MMHG
BH CV ECHO MEAS - AO ROOT DIAM: 3.1 CM
BH CV ECHO MEAS - AO V2 MAX: 130.3 CM/SEC
BH CV ECHO MEAS - AO V2 VTI: 31.7 CM
BH CV ECHO MEAS - EDV(CUBED): 75.2 ML
BH CV ECHO MEAS - EDV(MOD-SP4): 122 ML
BH CV ECHO MEAS - EF(MOD-SP4): 59.3 %
BH CV ECHO MEAS - EF_3D-VOL: 68 %
BH CV ECHO MEAS - ESV(CUBED): 18.4 ML
BH CV ECHO MEAS - ESV(MOD-SP4): 49.6 ML
BH CV ECHO MEAS - FS: 37.4 %
BH CV ECHO MEAS - IVS/LVPW: 0.99 CM
BH CV ECHO MEAS - IVSD: 1.29 CM
BH CV ECHO MEAS - LA DIMENSION: 3.9 CM
BH CV ECHO MEAS - LAT PEAK E' VEL: 9.6 CM/SEC
BH CV ECHO MEAS - LV DIASTOLIC VOL/BSA (35-75): 52.2 CM2
BH CV ECHO MEAS - LV MASS(C)D: 200.8 GRAMS
BH CV ECHO MEAS - LV SYSTOLIC VOL/BSA (12-30): 21.2 CM2
BH CV ECHO MEAS - LVIDD: 4.2 CM
BH CV ECHO MEAS - LVIDS: 2.6 CM
BH CV ECHO MEAS - LVPWD: 1.3 CM
BH CV ECHO MEAS - MED PEAK E' VEL: 7.9 CM/SEC
BH CV ECHO MEAS - MR MAX PG: 84.7 MMHG
BH CV ECHO MEAS - MR MAX VEL: 460.1 CM/SEC
BH CV ECHO MEAS - MR MEAN PG: 58.4 MMHG
BH CV ECHO MEAS - MR MEAN VEL: 363.8 CM/SEC
BH CV ECHO MEAS - MR VTI: 170 CM
BH CV ECHO MEAS - MV A MAX VEL: 140 CM/SEC
BH CV ECHO MEAS - MV DEC SLOPE: 419.8 CM/SEC2
BH CV ECHO MEAS - MV E MAX VEL: 144 CM/SEC
BH CV ECHO MEAS - MV E/A: 1.03
BH CV ECHO MEAS - MV MAX PG: 7.1 MMHG
BH CV ECHO MEAS - MV MEAN PG: 3.6 MMHG
BH CV ECHO MEAS - MV P1/2T: 94.7 MSEC
BH CV ECHO MEAS - MV V2 VTI: 54.7 CM
BH CV ECHO MEAS - MVA(P1/2T): 2.32 CM2
BH CV ECHO MEAS - RAP SYSTOLE: 10 MMHG
BH CV ECHO MEAS - RVDD: 2.49 CM
BH CV ECHO MEAS - RVSP: 30.6 MMHG
BH CV ECHO MEAS - SV(MOD-SP4): 72.4 ML
BH CV ECHO MEAS - SVI(MOD-SP4): 31 ML/M2
BH CV ECHO MEAS - TR MAX PG: 20.6 MMHG
BH CV ECHO MEAS - TR MAX VEL: 226.7 CM/SEC
BH CV ECHO MEASUREMENTS AVERAGE E/E' RATIO: 16.46
LEFT ATRIUM VOLUME INDEX: 22.9 ML/M2

## 2024-04-25 PROCEDURE — 93306 TTE W/DOPPLER COMPLETE: CPT

## 2024-06-13 ENCOUNTER — TELEPHONE (OUTPATIENT)
Dept: CARDIOLOGY | Facility: CLINIC | Age: 39
End: 2024-06-13
Payer: COMMERCIAL

## 2024-06-13 NOTE — TELEPHONE ENCOUNTER
Received fax from Dr. Pavel Sullivan for cardiac clearance for patient to have an L5-S1 PLIF. According to our records, I do not see where patient is on any blood thinners or has had any stenting.          Fax [phone]

## 2024-10-21 ENCOUNTER — TELEPHONE (OUTPATIENT)
Dept: CARDIOLOGY | Facility: CLINIC | Age: 39
End: 2024-10-21
Payer: COMMERCIAL

## 2024-10-21 NOTE — TELEPHONE ENCOUNTER
Received fax from Dr. Pavel Sullivan for cardiac clearance for patient to have an L5-S1 PLIF. According to our records, I do not see where patient is on any blood thinners or has had any stenting.          Fax [phone]